# Patient Record
Sex: FEMALE | Race: OTHER | NOT HISPANIC OR LATINO
[De-identification: names, ages, dates, MRNs, and addresses within clinical notes are randomized per-mention and may not be internally consistent; named-entity substitution may affect disease eponyms.]

---

## 2017-01-06 ENCOUNTER — APPOINTMENT (OUTPATIENT)
Dept: OBGYN | Facility: CLINIC | Age: 27
End: 2017-01-06

## 2017-01-06 VITALS
SYSTOLIC BLOOD PRESSURE: 98 MMHG | DIASTOLIC BLOOD PRESSURE: 68 MMHG | BODY MASS INDEX: 24.59 KG/M2 | HEIGHT: 64 IN | WEIGHT: 144 LBS

## 2017-02-03 ENCOUNTER — APPOINTMENT (OUTPATIENT)
Dept: ANTEPARTUM | Facility: CLINIC | Age: 27
End: 2017-02-03

## 2017-02-03 ENCOUNTER — APPOINTMENT (OUTPATIENT)
Dept: OBGYN | Facility: CLINIC | Age: 27
End: 2017-02-03

## 2017-02-03 ENCOUNTER — ASOB RESULT (OUTPATIENT)
Age: 27
End: 2017-02-03

## 2017-02-03 VITALS
DIASTOLIC BLOOD PRESSURE: 64 MMHG | WEIGHT: 148 LBS | SYSTOLIC BLOOD PRESSURE: 100 MMHG | HEIGHT: 64 IN | BODY MASS INDEX: 25.27 KG/M2

## 2017-02-15 ENCOUNTER — APPOINTMENT (OUTPATIENT)
Dept: ANTEPARTUM | Facility: CLINIC | Age: 27
End: 2017-02-15

## 2017-02-15 ENCOUNTER — ASOB RESULT (OUTPATIENT)
Age: 27
End: 2017-02-15

## 2017-03-10 ENCOUNTER — APPOINTMENT (OUTPATIENT)
Dept: OBGYN | Facility: CLINIC | Age: 27
End: 2017-03-10

## 2017-03-10 VITALS
DIASTOLIC BLOOD PRESSURE: 65 MMHG | SYSTOLIC BLOOD PRESSURE: 107 MMHG | HEIGHT: 64 IN | BODY MASS INDEX: 25.68 KG/M2 | WEIGHT: 150.4 LBS

## 2017-03-31 ENCOUNTER — APPOINTMENT (OUTPATIENT)
Dept: OBGYN | Facility: CLINIC | Age: 27
End: 2017-03-31

## 2017-03-31 VITALS
WEIGHT: 153 LBS | HEIGHT: 64 IN | BODY MASS INDEX: 26.12 KG/M2 | DIASTOLIC BLOOD PRESSURE: 63 MMHG | SYSTOLIC BLOOD PRESSURE: 100 MMHG

## 2017-04-04 LAB
BASOPHILS # BLD AUTO: 0.01 K/UL
BASOPHILS NFR BLD AUTO: 0.1 %
EOSINOPHIL # BLD AUTO: 0.08 K/UL
EOSINOPHIL NFR BLD AUTO: 0.9 %
GLUCOSE 1H P 50 G GLC PO SERPL-MCNC: 89 MG/DL
HCT VFR BLD CALC: 36.9 %
HGB BLD-MCNC: 12.2 G/DL
IMM GRANULOCYTES NFR BLD AUTO: 0.4 %
LYMPHOCYTES # BLD AUTO: 1.72 K/UL
LYMPHOCYTES NFR BLD AUTO: 18.3 %
MAN DIFF?: NORMAL
MCHC RBC-ENTMCNC: 30.1 PG
MCHC RBC-ENTMCNC: 33.1 GM/DL
MCV RBC AUTO: 91.1 FL
MONOCYTES # BLD AUTO: 0.62 K/UL
MONOCYTES NFR BLD AUTO: 6.6 %
NEUTROPHILS # BLD AUTO: 6.92 K/UL
NEUTROPHILS NFR BLD AUTO: 73.7 %
PLATELET # BLD AUTO: 241 K/UL
RBC # BLD: 4.05 M/UL
RBC # FLD: 14.2 %
WBC # FLD AUTO: 9.39 K/UL

## 2017-04-17 ENCOUNTER — APPOINTMENT (OUTPATIENT)
Dept: OBGYN | Facility: CLINIC | Age: 27
End: 2017-04-17

## 2017-04-17 VITALS
HEIGHT: 64 IN | SYSTOLIC BLOOD PRESSURE: 102 MMHG | BODY MASS INDEX: 26.77 KG/M2 | WEIGHT: 156.8 LBS | DIASTOLIC BLOOD PRESSURE: 68 MMHG

## 2017-04-21 ENCOUNTER — OTHER (OUTPATIENT)
Age: 27
End: 2017-04-21

## 2017-05-01 ENCOUNTER — APPOINTMENT (OUTPATIENT)
Dept: ANTEPARTUM | Facility: CLINIC | Age: 27
End: 2017-05-01

## 2017-05-01 ENCOUNTER — ASOB RESULT (OUTPATIENT)
Age: 27
End: 2017-05-01

## 2017-05-02 ENCOUNTER — APPOINTMENT (OUTPATIENT)
Dept: OBGYN | Facility: CLINIC | Age: 27
End: 2017-05-02

## 2017-05-02 VITALS
SYSTOLIC BLOOD PRESSURE: 96 MMHG | HEIGHT: 64 IN | DIASTOLIC BLOOD PRESSURE: 60 MMHG | WEIGHT: 152 LBS | BODY MASS INDEX: 25.95 KG/M2

## 2017-05-15 ENCOUNTER — APPOINTMENT (OUTPATIENT)
Dept: OBGYN | Facility: CLINIC | Age: 27
End: 2017-05-15

## 2017-05-15 VITALS
SYSTOLIC BLOOD PRESSURE: 95 MMHG | HEIGHT: 64 IN | BODY MASS INDEX: 27.31 KG/M2 | DIASTOLIC BLOOD PRESSURE: 63 MMHG | WEIGHT: 160 LBS

## 2017-05-16 ENCOUNTER — APPOINTMENT (OUTPATIENT)
Dept: ANTEPARTUM | Facility: CLINIC | Age: 27
End: 2017-05-16

## 2017-05-16 ENCOUNTER — OUTPATIENT (OUTPATIENT)
Dept: OUTPATIENT SERVICES | Facility: HOSPITAL | Age: 27
LOS: 1 days | End: 2017-05-16

## 2017-05-16 ENCOUNTER — ASOB RESULT (OUTPATIENT)
Age: 27
End: 2017-05-16

## 2017-05-17 DIAGNOSIS — O36.5930 MATERNAL CARE FOR OTHER KNOWN OR SUSPECTED POOR FETAL GROWTH, THIRD TRIMESTER, NOT APPLICABLE OR UNSPECIFIED: ICD-10-CM

## 2017-05-17 DIAGNOSIS — Z3A.35 35 WEEKS GESTATION OF PREGNANCY: ICD-10-CM

## 2017-05-23 ENCOUNTER — APPOINTMENT (OUTPATIENT)
Dept: ANTEPARTUM | Facility: CLINIC | Age: 27
End: 2017-05-23

## 2017-05-23 ENCOUNTER — ASOB RESULT (OUTPATIENT)
Age: 27
End: 2017-05-23

## 2017-05-23 ENCOUNTER — OUTPATIENT (OUTPATIENT)
Dept: OUTPATIENT SERVICES | Facility: HOSPITAL | Age: 27
LOS: 1 days | End: 2017-05-23

## 2017-05-23 ENCOUNTER — APPOINTMENT (OUTPATIENT)
Dept: OBGYN | Facility: CLINIC | Age: 27
End: 2017-05-23

## 2017-05-23 ENCOUNTER — APPOINTMENT (OUTPATIENT)
Dept: ANTEPARTUM | Facility: HOSPITAL | Age: 27
End: 2017-05-23

## 2017-05-23 VITALS
DIASTOLIC BLOOD PRESSURE: 66 MMHG | WEIGHT: 161 LBS | HEIGHT: 64 IN | SYSTOLIC BLOOD PRESSURE: 116 MMHG | BODY MASS INDEX: 27.49 KG/M2

## 2017-05-25 DIAGNOSIS — O36.5930 MATERNAL CARE FOR OTHER KNOWN OR SUSPECTED POOR FETAL GROWTH, THIRD TRIMESTER, NOT APPLICABLE OR UNSPECIFIED: ICD-10-CM

## 2017-05-25 DIAGNOSIS — Z3A.36 36 WEEKS GESTATION OF PREGNANCY: ICD-10-CM

## 2017-05-26 LAB
GP B STREP DNA SPEC QL NAA+PROBE: NORMAL
GP B STREP DNA SPEC QL NAA+PROBE: NOT DETECTED
SOURCE GBS: NORMAL

## 2017-05-30 ENCOUNTER — APPOINTMENT (OUTPATIENT)
Dept: OBGYN | Facility: CLINIC | Age: 27
End: 2017-05-30

## 2017-05-30 ENCOUNTER — APPOINTMENT (OUTPATIENT)
Dept: ANTEPARTUM | Facility: HOSPITAL | Age: 27
End: 2017-05-30

## 2017-05-30 ENCOUNTER — OUTPATIENT (OUTPATIENT)
Dept: OUTPATIENT SERVICES | Facility: HOSPITAL | Age: 27
LOS: 1 days | End: 2017-05-30

## 2017-05-30 ENCOUNTER — APPOINTMENT (OUTPATIENT)
Dept: ANTEPARTUM | Facility: CLINIC | Age: 27
End: 2017-05-30

## 2017-05-30 ENCOUNTER — ASOB RESULT (OUTPATIENT)
Age: 27
End: 2017-05-30

## 2017-05-30 VITALS
HEIGHT: 64 IN | SYSTOLIC BLOOD PRESSURE: 101 MMHG | DIASTOLIC BLOOD PRESSURE: 69 MMHG | WEIGHT: 160.5 LBS | BODY MASS INDEX: 27.4 KG/M2

## 2017-05-31 DIAGNOSIS — Z3A.37 37 WEEKS GESTATION OF PREGNANCY: ICD-10-CM

## 2017-05-31 DIAGNOSIS — O36.5930 MATERNAL CARE FOR OTHER KNOWN OR SUSPECTED POOR FETAL GROWTH, THIRD TRIMESTER, NOT APPLICABLE OR UNSPECIFIED: ICD-10-CM

## 2017-06-06 ENCOUNTER — APPOINTMENT (OUTPATIENT)
Dept: ANTEPARTUM | Facility: HOSPITAL | Age: 27
End: 2017-06-06

## 2017-06-06 ENCOUNTER — APPOINTMENT (OUTPATIENT)
Dept: ANTEPARTUM | Facility: CLINIC | Age: 27
End: 2017-06-06

## 2017-06-07 ENCOUNTER — APPOINTMENT (OUTPATIENT)
Dept: OBGYN | Facility: CLINIC | Age: 27
End: 2017-06-07

## 2017-06-07 VITALS
HEIGHT: 64 IN | WEIGHT: 160.31 LBS | SYSTOLIC BLOOD PRESSURE: 113 MMHG | BODY MASS INDEX: 27.37 KG/M2 | DIASTOLIC BLOOD PRESSURE: 75 MMHG

## 2017-06-07 DIAGNOSIS — Z00.00 ENCOUNTER FOR GENERAL ADULT MEDICAL EXAMINATION W/OUT ABNORMAL FINDINGS: ICD-10-CM

## 2017-06-07 DIAGNOSIS — Z34.02 ENCOUNTER FOR SUPERVISION OF NORMAL FIRST PREGNANCY, SECOND TRIMESTER: ICD-10-CM

## 2017-06-11 ENCOUNTER — OUTPATIENT (OUTPATIENT)
Dept: OUTPATIENT SERVICES | Facility: HOSPITAL | Age: 27
LOS: 1 days | End: 2017-06-11

## 2017-06-11 DIAGNOSIS — O26.899 OTHER SPECIFIED PREGNANCY RELATED CONDITIONS, UNSPECIFIED TRIMESTER: ICD-10-CM

## 2017-06-12 ENCOUNTER — INPATIENT (INPATIENT)
Facility: HOSPITAL | Age: 27
LOS: 1 days | Discharge: HOME | End: 2017-06-14
Attending: OBSTETRICS & GYNECOLOGY | Admitting: OBSTETRICS & GYNECOLOGY
Payer: COMMERCIAL

## 2017-06-12 VITALS — WEIGHT: 160.94 LBS | HEIGHT: 64 IN

## 2017-06-12 DIAGNOSIS — Z3A.00 WEEKS OF GESTATION OF PREGNANCY NOT SPECIFIED: ICD-10-CM

## 2017-06-12 DIAGNOSIS — O26.90 PREGNANCY RELATED CONDITIONS, UNSPECIFIED, UNSPECIFIED TRIMESTER: ICD-10-CM

## 2017-06-12 LAB
BASOPHILS # BLD AUTO: 0.02 K/UL — SIGNIFICANT CHANGE UP (ref 0–0.2)
BASOPHILS NFR BLD AUTO: 0.1 % — SIGNIFICANT CHANGE UP (ref 0–2)
BLD GP AB SCN SERPL QL: NEGATIVE — SIGNIFICANT CHANGE UP
EOSINOPHIL # BLD AUTO: 0.02 K/UL — SIGNIFICANT CHANGE UP (ref 0–0.5)
EOSINOPHIL NFR BLD AUTO: 0.1 % — SIGNIFICANT CHANGE UP (ref 0–6)
HCT VFR BLD CALC: 41.6 % — SIGNIFICANT CHANGE UP (ref 34.5–45)
HGB BLD-MCNC: 13.8 G/DL — SIGNIFICANT CHANGE UP (ref 11.5–15.5)
IMM GRANULOCYTES NFR BLD AUTO: 0.4 % — SIGNIFICANT CHANGE UP (ref 0–1.5)
LYMPHOCYTES # BLD AUTO: 15.4 % — SIGNIFICANT CHANGE UP (ref 13–44)
LYMPHOCYTES # BLD AUTO: 2.26 K/UL — SIGNIFICANT CHANGE UP (ref 1–3.3)
MCHC RBC-ENTMCNC: 29.6 PG — SIGNIFICANT CHANGE UP (ref 27–34)
MCHC RBC-ENTMCNC: 33.2 % — SIGNIFICANT CHANGE UP (ref 32–36)
MCV RBC AUTO: 89.3 FL — SIGNIFICANT CHANGE UP (ref 80–100)
MONOCYTES # BLD AUTO: 0.89 K/UL — SIGNIFICANT CHANGE UP (ref 0–0.9)
MONOCYTES NFR BLD AUTO: 6.1 % — SIGNIFICANT CHANGE UP (ref 2–14)
NEUTROPHILS # BLD AUTO: 11.45 K/UL — HIGH (ref 1.8–7.4)
NEUTROPHILS NFR BLD AUTO: 77.9 % — HIGH (ref 43–77)
PLATELET # BLD AUTO: 234 K/UL — SIGNIFICANT CHANGE UP (ref 150–400)
PMV BLD: 9.5 FL — SIGNIFICANT CHANGE UP (ref 7–13)
RBC # BLD: 4.66 M/UL — SIGNIFICANT CHANGE UP (ref 3.8–5.2)
RBC # FLD: 14.1 % — SIGNIFICANT CHANGE UP (ref 10.3–14.5)
RH IG SCN BLD-IMP: POSITIVE — SIGNIFICANT CHANGE UP
RH IG SCN BLD-IMP: POSITIVE — SIGNIFICANT CHANGE UP
T PALLIDUM AB TITR SER: NEGATIVE — SIGNIFICANT CHANGE UP
WBC # BLD: 14.7 K/UL — HIGH (ref 3.8–10.5)
WBC # FLD AUTO: 14.7 K/UL — HIGH (ref 3.8–10.5)

## 2017-06-12 PROCEDURE — 59400 OBSTETRICAL CARE: CPT | Mod: GC

## 2017-06-12 RX ORDER — ACETAMINOPHEN 500 MG
975 TABLET ORAL EVERY 6 HOURS
Qty: 0 | Refills: 0 | Status: COMPLETED | OUTPATIENT
Start: 2017-06-12 | End: 2018-05-11

## 2017-06-12 RX ORDER — SIMETHICONE 80 MG/1
80 TABLET, CHEWABLE ORAL EVERY 6 HOURS
Qty: 0 | Refills: 0 | Status: DISCONTINUED | OUTPATIENT
Start: 2017-06-12 | End: 2017-06-14

## 2017-06-12 RX ORDER — GLYCERIN ADULT
1 SUPPOSITORY, RECTAL RECTAL AT BEDTIME
Qty: 0 | Refills: 0 | Status: DISCONTINUED | OUTPATIENT
Start: 2017-06-12 | End: 2017-06-14

## 2017-06-12 RX ORDER — KETOROLAC TROMETHAMINE 30 MG/ML
30 SYRINGE (ML) INJECTION ONCE
Qty: 0 | Refills: 0 | Status: DISCONTINUED | OUTPATIENT
Start: 2017-06-12 | End: 2017-06-12

## 2017-06-12 RX ORDER — SODIUM CHLORIDE 9 MG/ML
1000 INJECTION, SOLUTION INTRAVENOUS ONCE
Qty: 0 | Refills: 0 | Status: COMPLETED | OUTPATIENT
Start: 2017-06-12 | End: 2017-06-12

## 2017-06-12 RX ORDER — OXYTOCIN 10 UNIT/ML
41.67 VIAL (ML) INJECTION
Qty: 20 | Refills: 0 | Status: DISCONTINUED | OUTPATIENT
Start: 2017-06-12 | End: 2017-06-12

## 2017-06-12 RX ORDER — HYDROCORTISONE 1 %
1 OINTMENT (GRAM) TOPICAL EVERY 4 HOURS
Qty: 0 | Refills: 0 | Status: DISCONTINUED | OUTPATIENT
Start: 2017-06-12 | End: 2017-06-12

## 2017-06-12 RX ORDER — DIPHENHYDRAMINE HCL 50 MG
25 CAPSULE ORAL EVERY 6 HOURS
Qty: 0 | Refills: 0 | Status: DISCONTINUED | OUTPATIENT
Start: 2017-06-12 | End: 2017-06-14

## 2017-06-12 RX ORDER — OXYCODONE HYDROCHLORIDE 5 MG/1
5 TABLET ORAL
Qty: 0 | Refills: 0 | Status: DISCONTINUED | OUTPATIENT
Start: 2017-06-12 | End: 2017-06-14

## 2017-06-12 RX ORDER — MAGNESIUM HYDROXIDE 400 MG/1
30 TABLET, CHEWABLE ORAL
Qty: 0 | Refills: 0 | Status: DISCONTINUED | OUTPATIENT
Start: 2017-06-12 | End: 2017-06-14

## 2017-06-12 RX ORDER — OXYTOCIN 10 UNIT/ML
2 VIAL (ML) INJECTION
Qty: 30 | Refills: 0 | Status: DISCONTINUED | OUTPATIENT
Start: 2017-06-12 | End: 2017-06-12

## 2017-06-12 RX ORDER — AER TRAVELER 0.5 G/1
1 SOLUTION RECTAL; TOPICAL EVERY 4 HOURS
Qty: 0 | Refills: 0 | Status: DISCONTINUED | OUTPATIENT
Start: 2017-06-12 | End: 2017-06-14

## 2017-06-12 RX ORDER — OXYCODONE HYDROCHLORIDE 5 MG/1
5 TABLET ORAL EVERY 4 HOURS
Qty: 0 | Refills: 0 | Status: DISCONTINUED | OUTPATIENT
Start: 2017-06-12 | End: 2017-06-14

## 2017-06-12 RX ORDER — LANOLIN
1 OINTMENT (GRAM) TOPICAL EVERY 6 HOURS
Qty: 0 | Refills: 0 | Status: DISCONTINUED | OUTPATIENT
Start: 2017-06-12 | End: 2017-06-14

## 2017-06-12 RX ORDER — TETANUS TOXOID, REDUCED DIPHTHERIA TOXOID AND ACELLULAR PERTUSSIS VACCINE, ADSORBED 5; 2.5; 8; 8; 2.5 [IU]/.5ML; [IU]/.5ML; UG/.5ML; UG/.5ML; UG/.5ML
0.5 SUSPENSION INTRAMUSCULAR ONCE
Qty: 0 | Refills: 0 | Status: COMPLETED | OUTPATIENT
Start: 2017-06-12

## 2017-06-12 RX ORDER — OXYTOCIN 10 UNIT/ML
333.3 VIAL (ML) INJECTION
Qty: 20 | Refills: 0 | Status: COMPLETED | OUTPATIENT
Start: 2017-06-12

## 2017-06-12 RX ORDER — HYDROCORTISONE 1 %
1 OINTMENT (GRAM) TOPICAL EVERY 4 HOURS
Qty: 0 | Refills: 0 | Status: DISCONTINUED | OUTPATIENT
Start: 2017-06-12 | End: 2017-06-14

## 2017-06-12 RX ORDER — OXYTOCIN 10 UNIT/ML
333.3 VIAL (ML) INJECTION
Qty: 20 | Refills: 0 | Status: COMPLETED | OUTPATIENT
Start: 2017-06-12 | End: 2017-06-12

## 2017-06-12 RX ORDER — DOCUSATE SODIUM 100 MG
100 CAPSULE ORAL
Qty: 0 | Refills: 0 | Status: DISCONTINUED | OUTPATIENT
Start: 2017-06-12 | End: 2017-06-14

## 2017-06-12 RX ORDER — PRAMOXINE HYDROCHLORIDE 150 MG/15G
1 AEROSOL, FOAM RECTAL EVERY 4 HOURS
Qty: 0 | Refills: 0 | Status: DISCONTINUED | OUTPATIENT
Start: 2017-06-12 | End: 2017-06-12

## 2017-06-12 RX ORDER — ACETAMINOPHEN 500 MG
975 TABLET ORAL EVERY 6 HOURS
Qty: 0 | Refills: 0 | Status: DISCONTINUED | OUTPATIENT
Start: 2017-06-12 | End: 2017-06-14

## 2017-06-12 RX ORDER — CITRIC ACID/SODIUM CITRATE 300-500 MG
15 SOLUTION, ORAL ORAL EVERY 4 HOURS
Qty: 0 | Refills: 0 | Status: DISCONTINUED | OUTPATIENT
Start: 2017-06-12 | End: 2017-06-12

## 2017-06-12 RX ORDER — IBUPROFEN 200 MG
600 TABLET ORAL EVERY 6 HOURS
Qty: 0 | Refills: 0 | Status: DISCONTINUED | OUTPATIENT
Start: 2017-06-12 | End: 2017-06-14

## 2017-06-12 RX ORDER — IBUPROFEN 200 MG
1 TABLET ORAL
Qty: 0 | Refills: 0 | COMMUNITY
Start: 2017-06-12

## 2017-06-12 RX ORDER — DIBUCAINE 1 %
1 OINTMENT (GRAM) RECTAL EVERY 4 HOURS
Qty: 0 | Refills: 0 | Status: DISCONTINUED | OUTPATIENT
Start: 2017-06-12 | End: 2017-06-14

## 2017-06-12 RX ORDER — DIBUCAINE 1 %
1 OINTMENT (GRAM) RECTAL EVERY 4 HOURS
Qty: 0 | Refills: 0 | Status: DISCONTINUED | OUTPATIENT
Start: 2017-06-12 | End: 2017-06-12

## 2017-06-12 RX ORDER — IBUPROFEN 200 MG
600 TABLET ORAL EVERY 6 HOURS
Qty: 0 | Refills: 0 | Status: COMPLETED | OUTPATIENT
Start: 2017-06-12 | End: 2018-05-11

## 2017-06-12 RX ORDER — SODIUM CHLORIDE 9 MG/ML
3 INJECTION INTRAMUSCULAR; INTRAVENOUS; SUBCUTANEOUS EVERY 8 HOURS
Qty: 0 | Refills: 0 | Status: DISCONTINUED | OUTPATIENT
Start: 2017-06-12 | End: 2017-06-12

## 2017-06-12 RX ORDER — SODIUM CHLORIDE 9 MG/ML
3 INJECTION INTRAMUSCULAR; INTRAVENOUS; SUBCUTANEOUS EVERY 8 HOURS
Qty: 0 | Refills: 0 | Status: DISCONTINUED | OUTPATIENT
Start: 2017-06-12 | End: 2017-06-14

## 2017-06-12 RX ORDER — AER TRAVELER 0.5 G/1
1 SOLUTION RECTAL; TOPICAL EVERY 4 HOURS
Qty: 0 | Refills: 0 | Status: DISCONTINUED | OUTPATIENT
Start: 2017-06-12 | End: 2017-06-12

## 2017-06-12 RX ORDER — PRAMOXINE HYDROCHLORIDE 150 MG/15G
1 AEROSOL, FOAM RECTAL EVERY 4 HOURS
Qty: 0 | Refills: 0 | Status: DISCONTINUED | OUTPATIENT
Start: 2017-06-12 | End: 2017-06-14

## 2017-06-12 RX ORDER — SODIUM CHLORIDE 9 MG/ML
1000 INJECTION, SOLUTION INTRAVENOUS
Qty: 0 | Refills: 0 | Status: DISCONTINUED | OUTPATIENT
Start: 2017-06-12 | End: 2017-06-12

## 2017-06-12 RX ADMIN — SODIUM CHLORIDE 125 MILLILITER(S): 9 INJECTION, SOLUTION INTRAVENOUS at 07:22

## 2017-06-12 RX ADMIN — SODIUM CHLORIDE 3 MILLILITER(S): 9 INJECTION INTRAMUSCULAR; INTRAVENOUS; SUBCUTANEOUS at 22:00

## 2017-06-12 RX ADMIN — Medication 999.9 MILLIUNIT(S)/MIN: at 13:07

## 2017-06-12 RX ADMIN — SODIUM CHLORIDE 2000 MILLILITER(S): 9 INJECTION, SOLUTION INTRAVENOUS at 02:15

## 2017-06-12 RX ADMIN — Medication 125 MILLIUNIT(S)/MIN: at 13:09

## 2017-06-12 RX ADMIN — Medication 2 MILLIUNIT(S)/MIN: at 08:42

## 2017-06-12 RX ADMIN — Medication 600 MILLIGRAM(S): at 22:50

## 2017-06-12 RX ADMIN — Medication 30 MILLIGRAM(S): at 13:09

## 2017-06-12 RX ADMIN — Medication 600 MILLIGRAM(S): at 23:20

## 2017-06-12 RX ADMIN — Medication 975 MILLIGRAM(S): at 23:20

## 2017-06-12 RX ADMIN — Medication 975 MILLIGRAM(S): at 22:50

## 2017-06-12 NOTE — DISCHARGE NOTE OB - HOSPITAL COURSE
27 P0 @ 39 weeks admitted with early labor. Delivered viable male infant apgars 9.9. Postpartum course uncomplicated.

## 2017-06-12 NOTE — DISCHARGE NOTE OB - MEDICATION SUMMARY - MEDICATIONS TO TAKE
I will START or STAY ON the medications listed below when I get home from the hospital:    ibuprofen 600 mg oral tablet  -- 1 tab(s) by mouth every 6 hours  -- Indication: For Pain I will START or STAY ON the medications listed below when I get home from the hospital:    ibuprofen 600 mg oral tablet  -- 1 tab(s) by mouth every 6 hours, As Needed  -- Indication: For Pain

## 2017-06-12 NOTE — DISCHARGE NOTE OB - CARE PROVIDER_API CALL
Morales Mercedes (MD), Obstetrics and Gynecology  [address] [place] [address]  Phone: [phone]  Fax: [phone]

## 2017-06-12 NOTE — DISCHARGE NOTE OB - MEDICATION SUMMARY - MEDICATIONS TO STOP TAKING
I will STOP taking the medications listed below when I get home from the hospital:    Percocet 5/325 oral tablet  -- 1 tab(s) by mouth every 8 hours, As Needed MDD:3 - for severe pain  -- Caution federal law prohibits the transfer of this drug to any person other  than the person for whom it was prescribed.  May cause drowsiness.  Alcohol may intensify this effect.  Use care when operating dangerous machinery.  This prescription cannot be refilled.  This product contains acetaminophen.  Do not use  with any other product containing acetaminophen to prevent possible liver damage.  Using more of this medication than prescribed may cause serious breathing problems.

## 2017-06-13 ENCOUNTER — APPOINTMENT (OUTPATIENT)
Dept: ANTEPARTUM | Facility: CLINIC | Age: 27
End: 2017-06-13

## 2017-06-13 RX ADMIN — SODIUM CHLORIDE 3 MILLILITER(S): 9 INJECTION INTRAMUSCULAR; INTRAVENOUS; SUBCUTANEOUS at 06:56

## 2017-06-13 RX ADMIN — Medication 1 TABLET(S): at 12:26

## 2017-06-13 RX ADMIN — Medication 100 MILLIGRAM(S): at 06:46

## 2017-06-13 NOTE — LACTATION INITIAL EVALUATION - INTERVENTION OUTCOME
Worked with mother to try to latch  and  not maintaining latch and over 24 hours and mother fitted for nipple shield for flat nipples and educated mother to look for colostrum in shield and none noted at this time and mother to triple feed, Discussed feeding cues and to breastfeed 8-12 times in 24 hours and discussed breast pumping every 2-3 hours and give expressed colostrum and mother getting 3cc and discussed to give formula , and educated mother on importance of wet and dirty diapers , RN aware of plan/demonstrates understanding of teaching/good return demonstration/verbalizes understanding

## 2017-06-13 NOTE — LACTATION INITIAL EVALUATION - LACTATION INTERVENTIONS
initiate skin to skin/initiate hand expression routine/initiate dual electric pump routine/stimulate nutritive suck using

## 2017-06-13 NOTE — PROGRESS NOTE ADULT - SUBJECTIVE AND OBJECTIVE BOX
S: Patient doing well. Minimal lochia. Pain controlled.    O: Vital Signs Last 24 Hrs  T(C): 36.9, Max: 37.2 (06-12 @ 21:30)  T(F): 98.4, Max: 99 (06-12 @ 21:30)  HR: 82 (82 - 90)  BP: 113/60 (95/52 - 113/60)  BP(mean): --  RR: 18 (16 - 18)  SpO2: 99% (99% - 99%)    Gen: NAD  Abd: soft, NT, ND, fundus firm below umbilicus  Lochia: moderate  Ext: no tenderness    Labs:                        13.8   14.70 )-----------( 234      ( 2017 02:10 )             41.6       A: 27y PPD# 1 s/p  doing well.    Plan:for discharge in AM

## 2017-06-14 VITALS
TEMPERATURE: 98 F | RESPIRATION RATE: 18 BRPM | SYSTOLIC BLOOD PRESSURE: 99 MMHG | DIASTOLIC BLOOD PRESSURE: 61 MMHG | HEART RATE: 67 BPM | OXYGEN SATURATION: 100 %

## 2017-06-14 RX ORDER — TETANUS TOXOID, REDUCED DIPHTHERIA TOXOID AND ACELLULAR PERTUSSIS VACCINE, ADSORBED 5; 2.5; 8; 8; 2.5 [IU]/.5ML; [IU]/.5ML; UG/.5ML; UG/.5ML; UG/.5ML
0.5 SUSPENSION INTRAMUSCULAR ONCE
Qty: 0 | Refills: 0 | Status: COMPLETED | OUTPATIENT
Start: 2017-06-14 | End: 2017-06-14

## 2017-06-14 RX ADMIN — TETANUS TOXOID, REDUCED DIPHTHERIA TOXOID AND ACELLULAR PERTUSSIS VACCINE, ADSORBED 0.5 MILLILITER(S): 5; 2.5; 8; 8; 2.5 SUSPENSION INTRAMUSCULAR at 06:45

## 2017-06-14 RX ADMIN — Medication 0.5 MILLILITER(S): at 11:33

## 2017-06-16 ENCOUNTER — APPOINTMENT (OUTPATIENT)
Dept: OBGYN | Facility: CLINIC | Age: 27
End: 2017-06-16

## 2017-07-28 ENCOUNTER — APPOINTMENT (OUTPATIENT)
Dept: OBGYN | Facility: CLINIC | Age: 27
End: 2017-07-28
Payer: COMMERCIAL

## 2017-07-28 VITALS
SYSTOLIC BLOOD PRESSURE: 105 MMHG | DIASTOLIC BLOOD PRESSURE: 69 MMHG | HEIGHT: 64 IN | BODY MASS INDEX: 25.36 KG/M2 | HEART RATE: 64 BPM | WEIGHT: 148.56 LBS

## 2017-07-28 PROCEDURE — 0503F POSTPARTUM CARE VISIT: CPT

## 2017-07-31 LAB
CANDIDA VAG CYTO: NOT DETECTED
G VAGINALIS+PREV SP MTYP VAG QL MICRO: DETECTED
T VAGINALIS VAG QL WET PREP: NOT DETECTED

## 2017-12-05 ENCOUNTER — APPOINTMENT (OUTPATIENT)
Dept: OBGYN | Facility: CLINIC | Age: 27
End: 2017-12-05

## 2018-06-19 ENCOUNTER — EMERGENCY (EMERGENCY)
Facility: HOSPITAL | Age: 28
LOS: 1 days | Discharge: LEFT BEFORE DISCHARGE | End: 2018-06-19
Attending: STUDENT IN AN ORGANIZED HEALTH CARE EDUCATION/TRAINING PROGRAM | Admitting: STUDENT IN AN ORGANIZED HEALTH CARE EDUCATION/TRAINING PROGRAM
Payer: COMMERCIAL

## 2018-06-19 VITALS
TEMPERATURE: 98 F | HEART RATE: 60 BPM | SYSTOLIC BLOOD PRESSURE: 95 MMHG | DIASTOLIC BLOOD PRESSURE: 64 MMHG | OXYGEN SATURATION: 100 % | RESPIRATION RATE: 16 BRPM

## 2018-06-19 VITALS
TEMPERATURE: 98 F | DIASTOLIC BLOOD PRESSURE: 63 MMHG | RESPIRATION RATE: 16 BRPM | HEART RATE: 72 BPM | OXYGEN SATURATION: 98 % | SYSTOLIC BLOOD PRESSURE: 100 MMHG

## 2018-06-19 LAB
ALBUMIN SERPL ELPH-MCNC: 3.4 G/DL — SIGNIFICANT CHANGE UP (ref 3.3–5)
ALP SERPL-CCNC: 56 U/L — SIGNIFICANT CHANGE UP (ref 40–120)
ALT FLD-CCNC: 20 U/L — SIGNIFICANT CHANGE UP (ref 4–33)
AST SERPL-CCNC: 16 U/L — SIGNIFICANT CHANGE UP (ref 4–32)
BASE EXCESS BLDV CALC-SCNC: -1 MMOL/L — SIGNIFICANT CHANGE UP
BASOPHILS # BLD AUTO: 0.02 K/UL — SIGNIFICANT CHANGE UP (ref 0–0.2)
BASOPHILS NFR BLD AUTO: 0.2 % — SIGNIFICANT CHANGE UP (ref 0–2)
BILIRUB SERPL-MCNC: < 0.2 MG/DL — LOW (ref 0.2–1.2)
BLOOD GAS VENOUS - CREATININE: 0.61 MG/DL — SIGNIFICANT CHANGE UP (ref 0.5–1.3)
BUN SERPL-MCNC: 13 MG/DL — SIGNIFICANT CHANGE UP (ref 7–23)
CALCIUM SERPL-MCNC: 8.2 MG/DL — LOW (ref 8.4–10.5)
CHLORIDE BLDV-SCNC: 113 MMOL/L — HIGH (ref 96–108)
CHLORIDE SERPL-SCNC: 106 MMOL/L — SIGNIFICANT CHANGE UP (ref 98–107)
CO2 SERPL-SCNC: 22 MMOL/L — SIGNIFICANT CHANGE UP (ref 22–31)
CREAT SERPL-MCNC: 0.66 MG/DL — SIGNIFICANT CHANGE UP (ref 0.5–1.3)
EOSINOPHIL # BLD AUTO: 0.13 K/UL — SIGNIFICANT CHANGE UP (ref 0–0.5)
EOSINOPHIL NFR BLD AUTO: 1.5 % — SIGNIFICANT CHANGE UP (ref 0–6)
GAS PNL BLDV: 135 MMOL/L — LOW (ref 136–146)
GLUCOSE BLDV-MCNC: 92 — SIGNIFICANT CHANGE UP (ref 70–99)
GLUCOSE SERPL-MCNC: 92 MG/DL — SIGNIFICANT CHANGE UP (ref 70–99)
HCO3 BLDV-SCNC: 22 MMOL/L — SIGNIFICANT CHANGE UP (ref 20–27)
HCT VFR BLD CALC: 36.5 % — SIGNIFICANT CHANGE UP (ref 34.5–45)
HCT VFR BLDV CALC: 39.4 % — SIGNIFICANT CHANGE UP (ref 34.5–45)
HGB BLD-MCNC: 12 G/DL — SIGNIFICANT CHANGE UP (ref 11.5–15.5)
HGB BLDV-MCNC: 12.8 G/DL — SIGNIFICANT CHANGE UP (ref 11.5–15.5)
IMM GRANULOCYTES # BLD AUTO: 0.02 # — SIGNIFICANT CHANGE UP
IMM GRANULOCYTES NFR BLD AUTO: 0.2 % — SIGNIFICANT CHANGE UP (ref 0–1.5)
LACTATE BLDV-MCNC: 0.9 MMOL/L — SIGNIFICANT CHANGE UP (ref 0.5–2)
LIDOCAIN IGE QN: 25.2 U/L — SIGNIFICANT CHANGE UP (ref 7–60)
LYMPHOCYTES # BLD AUTO: 1.73 K/UL — SIGNIFICANT CHANGE UP (ref 1–3.3)
LYMPHOCYTES # BLD AUTO: 20.2 % — SIGNIFICANT CHANGE UP (ref 13–44)
MCHC RBC-ENTMCNC: 28.1 PG — SIGNIFICANT CHANGE UP (ref 27–34)
MCHC RBC-ENTMCNC: 32.9 % — SIGNIFICANT CHANGE UP (ref 32–36)
MCV RBC AUTO: 85.5 FL — SIGNIFICANT CHANGE UP (ref 80–100)
MONOCYTES # BLD AUTO: 0.51 K/UL — SIGNIFICANT CHANGE UP (ref 0–0.9)
MONOCYTES NFR BLD AUTO: 6 % — SIGNIFICANT CHANGE UP (ref 2–14)
NEUTROPHILS # BLD AUTO: 6.14 K/UL — SIGNIFICANT CHANGE UP (ref 1.8–7.4)
NEUTROPHILS NFR BLD AUTO: 71.9 % — SIGNIFICANT CHANGE UP (ref 43–77)
NRBC # FLD: 0 — SIGNIFICANT CHANGE UP
PCO2 BLDV: 46 MMHG — SIGNIFICANT CHANGE UP (ref 41–51)
PH BLDV: 7.34 PH — SIGNIFICANT CHANGE UP (ref 7.32–7.43)
PLATELET # BLD AUTO: 221 K/UL — SIGNIFICANT CHANGE UP (ref 150–400)
PMV BLD: 9.5 FL — SIGNIFICANT CHANGE UP (ref 7–13)
PO2 BLDV: < 24 MMHG — LOW (ref 35–40)
POTASSIUM BLDV-SCNC: 3.6 MMOL/L — SIGNIFICANT CHANGE UP (ref 3.4–4.5)
POTASSIUM SERPL-MCNC: 4.1 MMOL/L — SIGNIFICANT CHANGE UP (ref 3.5–5.3)
POTASSIUM SERPL-SCNC: 4.1 MMOL/L — SIGNIFICANT CHANGE UP (ref 3.5–5.3)
PROT SERPL-MCNC: 6 G/DL — SIGNIFICANT CHANGE UP (ref 6–8.3)
RBC # BLD: 4.27 M/UL — SIGNIFICANT CHANGE UP (ref 3.8–5.2)
RBC # FLD: 12.9 % — SIGNIFICANT CHANGE UP (ref 10.3–14.5)
SAO2 % BLDV: 16 % — LOW (ref 60–85)
SODIUM SERPL-SCNC: 140 MMOL/L — SIGNIFICANT CHANGE UP (ref 135–145)
TROPONIN T, HIGH SENSITIVITY RESULT: < 6 NG/L — SIGNIFICANT CHANGE UP (ref ?–14)
WBC # BLD: 8.55 K/UL — SIGNIFICANT CHANGE UP (ref 3.8–10.5)
WBC # FLD AUTO: 8.55 K/UL — SIGNIFICANT CHANGE UP (ref 3.8–10.5)

## 2018-06-19 PROCEDURE — 99285 EMERGENCY DEPT VISIT HI MDM: CPT | Mod: 25

## 2018-06-19 PROCEDURE — 12011 RPR F/E/E/N/L/M 2.5 CM/<: CPT

## 2018-06-19 PROCEDURE — 72125 CT NECK SPINE W/O DYE: CPT | Mod: 26

## 2018-06-19 PROCEDURE — 70486 CT MAXILLOFACIAL W/O DYE: CPT | Mod: 26

## 2018-06-19 PROCEDURE — 70450 CT HEAD/BRAIN W/O DYE: CPT | Mod: 26

## 2018-06-19 PROCEDURE — 93010 ELECTROCARDIOGRAM REPORT: CPT | Mod: XU

## 2018-06-19 RX ORDER — SODIUM CHLORIDE 9 MG/ML
1000 INJECTION INTRAMUSCULAR; INTRAVENOUS; SUBCUTANEOUS ONCE
Qty: 0 | Refills: 0 | Status: COMPLETED | OUTPATIENT
Start: 2018-06-19 | End: 2018-06-19

## 2018-06-19 RX ORDER — TETANUS TOXOID, REDUCED DIPHTHERIA TOXOID AND ACELLULAR PERTUSSIS VACCINE, ADSORBED 5; 2.5; 8; 8; 2.5 [IU]/.5ML; [IU]/.5ML; UG/.5ML; UG/.5ML; UG/.5ML
0.5 SUSPENSION INTRAMUSCULAR ONCE
Qty: 0 | Refills: 0 | Status: DISCONTINUED | OUTPATIENT
Start: 2018-06-19 | End: 2018-06-23

## 2018-06-19 RX ORDER — ACETAMINOPHEN 500 MG
1000 TABLET ORAL ONCE
Qty: 0 | Refills: 0 | Status: COMPLETED | OUTPATIENT
Start: 2018-06-19 | End: 2018-06-19

## 2018-06-19 RX ORDER — ACETAMINOPHEN 500 MG
650 TABLET ORAL ONCE
Qty: 0 | Refills: 0 | Status: DISCONTINUED | OUTPATIENT
Start: 2018-06-19 | End: 2018-06-19

## 2018-06-19 RX ORDER — SODIUM CHLORIDE 9 MG/ML
1000 INJECTION INTRAMUSCULAR; INTRAVENOUS; SUBCUTANEOUS ONCE
Qty: 0 | Refills: 0 | Status: DISCONTINUED | OUTPATIENT
Start: 2018-06-19 | End: 2018-06-23

## 2018-06-19 RX ADMIN — Medication 400 MILLIGRAM(S): at 10:26

## 2018-06-19 RX ADMIN — SODIUM CHLORIDE 1000 MILLILITER(S): 9 INJECTION INTRAMUSCULAR; INTRAVENOUS; SUBCUTANEOUS at 09:15

## 2018-06-19 NOTE — ED ADULT TRIAGE NOTE - CHIEF COMPLAINT QUOTE
arrived by ambulance in c-collar c/o syncopal episode this morning while at pt  found her on floor, pt unable to recall events,  laceration noted to lip and bloody nose denies cp sob arrived by ambulance in c-collar c/o syncopal episode this morning while at pt  found her on floor, pt unable to recall events,  laceration noted to lip and bloody nose denies cp sob  arrived by ambulance A&OX3 in c-collar c/o syncopal episode this morning while at pt  found her on floor, pt unable to recall events,  laceration noted to lip and bloody nose denies cp sob

## 2018-06-19 NOTE — ED PROVIDER NOTE - ENMT, MLM
Airway patent, Nasal mucosa clear. Mouth with normal mucosa. Throat has no vesicles, no oropharyngeal exudates and uvula is midline. No loose or cracked teeth. Small chip to tooth # , old as per patient. 1cm laceration to inner lower lip. No mvmt of jaw when manipulating teeth. No nasal septal hematoma, hyphema, hemotympanum. Airway patent, Nasal mucosa clear. Mouth with normal mucosa. Throat has no vesicles, no oropharyngeal exudates and uvula is midline. No loose or cracked teeth. Small chip to tooth #9 , old as per patient. 1cm laceration to inner lower lip. No mvmt of jaw when manipulating teeth. bite intact. No nasal septal hematoma, hyphema, hemotympanum. Airway patent, Nasal mucosa clear. Mouth with normal mucosa. Throat has no vesicles, no oropharyngeal exudates and uvula is midline. No loose or cracked teeth. Small chip to tooth #9 , old as per patient. 1cm laceration to inner lower lip. No mvmt of jaw when manipulating teeth. bite intact. No nasal septal hematoma, hyphema, hemotympanum. Dried blood around nares, tender to palpation over tip of nose, philtrum.

## 2018-06-19 NOTE — ED PROVIDER NOTE - PROGRESS NOTE DETAILS
Femi: thinks she had a tetanus shot with her recent pregnancy, does not wish to have another at this time. Femi: The patient has decided to leave against medical advice.  It has been explained to the patient that leaving prior to completion of work up and treatment may result in recurrent or worsening of symptoms, severe permanent disability, pain and suffering, and/or even death.  The patient has demonstrated comprehension and verbalizes understanding of these risks.  The patient has been told that he/she must return to the ER immediately for persistent or recurring symptoms, worsening symptoms, or any concerning symptoms.  The patient has also been informed that they may return to the ER immediately at any time if they change their mind and wish to resume care. The patient has been given the opportunity to ask questions about their medical condition.

## 2018-06-19 NOTE — ED PROVIDER NOTE - MEDICAL DECISION MAKING DETAILS
28F with syncopal event, r/o arrhythmia, fx, ich, electrolyte disturbance. check labs with cth, c-spine, max face. ucg. laceration repair.

## 2018-06-19 NOTE — ED PROVIDER NOTE - OBJECTIVE STATEMENT
28F with no PMHx presents after syncopal event. Patient woke up and felt abdominal cramping, went to the bathroom to have diarrhea. While on the toilet, patient felt lightheaded and then the next things she recalls she was on the floor with blood around her.  tried to assist her up to her feet but she felt lightheaded. EMS called. Patient placed in c-collar for neck pain. currently c/o neck pain and headache, facial pain- mainly the tip of her nose and inner lip. Denies any chest or abdominal pain, nausea, vomiting, fever, chills, dysuria, urinary frequency. Was feeling well last night, no alcohol use, went to bed early. LMP 3 weeks ago.

## 2018-06-19 NOTE — ED ADULT NURSE NOTE - OBJECTIVE STATEMENT
Pt BIBA after falling in bathroom when feeling dizzy while using the toilet. Pt arrived with O2 via nasal canula, NS infusing, and IV in Right hand placed by EMS. Pt is A&O x4. Pt had diarrhea and a stomach ache in the morning but denies any current N/V/D. Pt is in C-collar and complains of 10/10 pain in head, face, and neck. Pt states she does not want anything for pain because she does not take pain meds. VS are stable, 20 G IV placed in Left AC. Labs obtained and sent as ordered. MD examined. Pt awaiting CT. Safety precautions maintained and call be in pts hand.

## 2018-06-19 NOTE — ED ADULT NURSE NOTE - CHIEF COMPLAINT QUOTE
arrived by ambulance A&OX3 in c-collar c/o syncopal episode this morning while at pt  found her on floor, pt unable to recall events,  laceration noted to lip and bloody nose denies cp sob

## 2021-03-17 ENCOUNTER — APPOINTMENT (OUTPATIENT)
Dept: OBGYN | Facility: CLINIC | Age: 31
End: 2021-03-17
Payer: COMMERCIAL

## 2021-03-17 ENCOUNTER — APPOINTMENT (OUTPATIENT)
Dept: OBGYN | Facility: CLINIC | Age: 31
End: 2021-03-17

## 2021-03-17 VITALS
SYSTOLIC BLOOD PRESSURE: 120 MMHG | BODY MASS INDEX: 25.61 KG/M2 | HEIGHT: 64 IN | WEIGHT: 150 LBS | DIASTOLIC BLOOD PRESSURE: 76 MMHG

## 2021-03-17 DIAGNOSIS — Z31.69 ENCOUNTER FOR OTHER GENERAL COUNSELING AND ADVICE ON PROCREATION: ICD-10-CM

## 2021-03-17 DIAGNOSIS — Z30.09 ENCOUNTER FOR OTHER GENERAL COUNSELING AND ADVICE ON CONTRACEPTION: ICD-10-CM

## 2021-03-17 PROCEDURE — 99214 OFFICE O/P EST MOD 30 MIN: CPT

## 2021-03-17 PROCEDURE — 99072 ADDL SUPL MATRL&STAF TM PHE: CPT

## 2021-03-17 RX ORDER — DOCONEXENT, NIACINAMIDE, .ALPHA.-TOCOPHEROL ACETATE, DL-, CHOLECALCIFEROL, .BETA.-CAROTENE, ASCORBIC ACID, THIAMINE MONONITRATE, RIBOFLAVIN, PYRIDOXINE HYDROCHLORIDE, CYANOCOBALAMIN, IRON, ZINC OXIDE, CUPRIC OXIDE, POTASSIUM IODIDE, MAGNESIUM OXIDE, FOLIC ACID, AND LEVOMEFOLATE CALCIUM 200; 15; 20; 1000; 1100; 30; 1.6; 1.8; 2.5; 12; 29; 25; 2; 150; 20; .4; .6 MG/1; MG/1; [IU]/1; [IU]/1; [IU]/1; MG/1; MG/1; MG/1; MG/1; UG/1; MG/1; MG/1; MG/1; UG/1; MG/1; MG/1; MG/1
29-0.6-0.4-2 CAPSULE, LIQUID FILLED ORAL
Qty: 90 | Refills: 3 | Status: ACTIVE | COMMUNITY
Start: 2021-03-17 | End: 1900-01-01

## 2021-03-17 NOTE — PLAN
[FreeTextEntry1] : d/w pt birth control options\par also discussed with pt pnv and timed intercourse

## 2021-03-17 NOTE — HISTORY OF PRESENT ILLNESS
[FreeTextEntry1] : pt presents to discuss options. states she wants birth control but  wants to have a baby

## 2021-06-07 ENCOUNTER — APPOINTMENT (OUTPATIENT)
Dept: OBGYN | Facility: CLINIC | Age: 31
End: 2021-06-07
Payer: COMMERCIAL

## 2021-06-07 VITALS
SYSTOLIC BLOOD PRESSURE: 104 MMHG | WEIGHT: 154 LBS | HEIGHT: 62 IN | BODY MASS INDEX: 28.34 KG/M2 | DIASTOLIC BLOOD PRESSURE: 64 MMHG

## 2021-06-07 PROCEDURE — 99395 PREV VISIT EST AGE 18-39: CPT

## 2021-06-07 PROCEDURE — 99072 ADDL SUPL MATRL&STAF TM PHE: CPT

## 2021-06-21 LAB
C TRACH RRNA SPEC QL NAA+PROBE: NOT DETECTED
HPV HIGH+LOW RISK DNA PNL CVX: NOT DETECTED
N GONORRHOEA RRNA SPEC QL NAA+PROBE: NOT DETECTED
SOURCE TP AMPLIFICATION: NORMAL

## 2021-07-02 NOTE — LACTATION INITIAL EVALUATION - BREAST ASSESSMENT (LEFT)
Lateral Wound almost healed (dorsal well healed, medial red streak dry, stable), mechanical debridement  Brother  last week, possible overdose  Cont same, f/u 2 wks hopefully will be healed    Patient: Phu Navarro Date: 2021   : 1981    40 year old female      OUTPATIENT WOUND CARE CONSULT NOTE    Supervising Wound Care / Hyperbaric Medicine Physician: Not Applicable  Consulting Provider:  Vitor Guadarrama DO  Date of Consultation/Last Comprehensive Exam:  2021  Referring  Provider:  Aaron Fleming MD    SUBJECTIVE:    Chief Complaint: Non-healing surgical wound     Wound/Ulcer Present:    Non-healing surgical wound    Additional Wound Category:  Venous leg ulcer:  Current Vascular Assessment:  Physical exam.  Current Venous Type:  Venous insufficiency contributing to non-healing surgical wound    Has the patient received adequate compression therapy for equal to or greater than 4 weeks?  N/A       Maximum Baseline Ambulatory Status:  Ambulates unassisted    History of Present Illness:  This is a 40 year old female who underwent foot surgery on 2021 to correct left cavovarus/peroneal tendinitis. There were several surgical incisions, most of which have healed without problem. Her left lateral leg/ankle incision developed dehiscence and the pt was referred to wound care for further evaluation and treatment of this wound.   The pt reports otherwise she is doing well. She does have tenderness to the wound and remarks that she tends to sleep with her leg externally rotated from the hip so that the open incision has pressure/friction to the area.   No fever or chills, not much drainage. She does have problems with intermittent LE swelling. She tends to bruise/bleed easily d/t anticoagulation. She is on warfarin d/t previous PE's. She has been on metformin for years to treat PCOs but she was recently diagnosed with diabetes. A1c at 6.5 on metformin. She isn't testing blood sugars yet but does  have appt with DM education at end of June.     Update 7/02/2021: Pt here for f/u of the above. She did see Dr. Fleming last week and he had no concerns about her progress. Pt tolerating Medihoney and VCC wraps without difficulty. No fever or chills.     Current Treatment Regimen:  Dressing:  Medihoney and VCC compression wrap  Frequency:  Twice a week   Changed by:  Wound care clinic nurse    Review of Systems:  Pertinent items are noted in HPI (history of present illness).    Past Medical History:   Diagnosis Date   • Acid reflux    • Acne    • Anemia     per patient   • Anxiety    • Arthritis    • Bronchitis    • Depression    • Hematuria, gross 5/8/2017   • Hepatic steatosis 10/30/2018   • Hypothyroidism    • Nervous disorder    • Obesity, unspecified    • Polycystic ovarian syndrome    • PONV (postoperative nausea and vomiting)     nausea and vomiting from back injections with local sedation   • Surgical wound, non healing, initial encounter 6/17/2021   • Thyroid disease    • Type II or unspecified type diabetes mellitus without mention of complication, not stated as uncontrolled 04/01/2006    pre diabetic per patient   • Unspecified sleep apnea     no cpap use per patient   • Urinary incontinence     takes vesicare, wears pads per patient   • Vitamin D deficiency      Past Surgical History:   Procedure Laterality Date   • Achilles tendon surgery Left 04/16/2021   • Appendectomy  01/01/1997   • Carpal tunnel release  07/19/2010    right   • Carpal tunnel release  07/14/2004    left   • Cholecystectomy  12/07/2018    Robotic (Dr. Gonzalez)   • Cystourethroscopy  06/19/2017   • Esophagogastroduodenoscopy transoral flex w/bx single or mult  06/21/2018    Dr Levy    • Foot surgery Right     has hardware   • Inj paravert facet anes,lumbar  2/7/2006    Jet Lumbar Medial Branch Block   • Inj paravert facet anes,lumbar  2/21/2006    Bilateral Lumbar Medial Branch Block   • Mouth surgery proc unlisted  09/05/2002     plastic repair of palate   • Past surgical history Bilateral     9/29/2020 left knee genicular cooled radio frequency ablation and on 10/06/2020 right knee genicular cooled radio frequency ablation with Dr. Arauz   • Past surgical history Right 03/13/2013    right foot procedure with Dr. Fleming   • Removal of tonsils,10+ y/o  09/05/2002    Tonsillectomy (as adult)   • Vascular surgery  6-5-2012    lumbar medial branch block   • Xray mammogram screening bilat  01/07/2011     Social History     Socioeconomic History   • Marital status: /Civil Union     Spouse name: [place]   • Number of children: 1   • Years of education: Not on file   • Highest education level: Not on file   Occupational History   • Occupation: DISABILITY     Comment: UNEMPLOYED   Tobacco Use   • Smoking status: Never Smoker   • Smokeless tobacco: Never Used   Substance and Sexual Activity   • Alcohol use: No   • Drug use: No   • Sexual activity: Yes     Partners: Male   Other Topics Concern   • Not on file   Social History Narrative   • Not on file     Social Determinants of Health     Financial Resource Strain:    • Social Determinants: Financial Resource Strain:    Food Insecurity:    • Social Determinants: Food Insecurity:    Transportation Needs:    • Lack of Transportation (Medical):    • Lack of Transportation (Non-Medical):    Physical Activity:    • Days of Exercise per Week:    • Minutes of Exercise per Session:    Stress:    • Social Determinants: Stress:    Social Connections:    • Social Determinants: Social Connections:    Intimate Partner Violence: Not At Risk   • Social Determinants: Intimate Partner Violence Past Fear: No   • Social Determinants: Intimate Partner Violence Current Fear: No     Family History   Problem Relation Age of Onset   • Hypertension Mother    • Heart Mother    • Hypertension Father    • Cancer Paternal Grandmother         breast   • Thyroid Paternal Grandmother    • Hypertension Paternal Grandmother     • Glaucoma Paternal Grandmother    • Cancer Maternal Aunt         breast   • Diabetes Paternal Uncle    • Cancer, Colon Paternal Uncle    • Genitourinary Neg Hx         neg gu cancers       Current Outpatient Medications   Medication Sig   • pantoprazole (PROTONIX) 40 MG tablet Take 1 tablet by mouth daily.   • blood glucose meter Test blood sugar 1 times daily. Diagnosis: E11.9. Meter: Insurance preferred   • blood glucose test strip Test blood sugar 1 times daily. Diagnosis: E11.9. Meter: Insurance Preferred   • blood glucose lancets Test blood sugar 1 times daily. Diagnosis: E11.9. Meter: Insurance Preferred   • metFORMIN (GLUCOPHAGE-XR) 500 MG 24 hr tablet Take 1 tablet by mouth 2 times daily.   • Euthyrox 150 MCG tablet Take 1 tablet by mouth once daily   • Probiotic Product (Florajen3) capsule Take 1 capsule by mouth 2 times daily.   • mirabegron ER (Myrbetriq) 25 MG 24 hour tablet Take 1 tablet by mouth daily. Patient to follow up in 2 weeks.   • DULoxetine (CYMBALTA) 60 MG capsule Take 2 capsules by mouth every evening.   • warfarin (COUMADIN) 5 MG tablet TAKE 1 & 1/2 (ONE & ONE-HALF) TABLETS BY MOUTH ONCE DAILY OR AS DIRECTED BY ANTICOAGULATION CLINIC   • gabapentin (NEURONTIN) 300 MG capsule Take 1 capsule by mouth 3 times daily.   • oxyCODONE-acetaminophen (Percocet) 5-325 MG per tablet Take 1-2 tablets by mouth every 6 hours as needed for Pain.   • ferrous gluconate 324 (37.5 Fe) MG tablet Take 1 tablet by mouth 2 times daily.   • Nystop [id number] UNIT/GM powder APPLY  POWDER TOPICALLY THREE TIMES DAILY   • tizanidine (ZANAFLEX) 4 MG capsule Take 1 capsule by mouth at bedtime as needed for Muscle spasms.     No current facility-administered medications for this encounter.        ALLERGIES:  Patient has no known allergies.    OBJECTIVE:  Vital Signs:    Visit Vitals  /80 (BP Location: LFA - Left forearm, Patient Position: Supine)   Pulse 84   Temp 97.2 °F (36.2 °C) (Temporal)   Resp 20   LMP  02/02/2021         Physical Exam:  General appearance: Appears stated age, obese, oriented to person, place, time and situation, in no distress and cooperative  Pulmonary: normal respiratory effort  Extremities: RLE, 2 + edema RLE    Previously: Cardiac: Heart:  regular rate and rhythm and Pedal pulses multiphasic b/l      Ulcer and Wound:   L lateral ankle with linear incision. There is now only one open area with slough at the base. I'm able to remove most of this with mechanical debridement (saline and gauze).    There is a very superficial linear abrasion to the medial foot which I believe is a friction injury. It is stable     Her dorsal foot wound remains closed/healed.     7/02/2021        Wound Bed Quality:  Granulation tissue, Fibrin and Alfie-epithelialization      Mary Beth-wound Quality:    None    Additional Descriptors:  drainage - light to moderate    Wound Measurements Per Flowsheet:       Wound Heel Left Incision (Active)   Number of days: 77       Wound Ankle Left Outer Incision (Active)   Number of days: 77       Wound Foot Left Interior/Inner Incision (Active)   Number of days: 77       Wound Ankle Left Lateral Surgical Wound (Active)   Wound Care Team Consult Date 06/17/21 06/17/21 1300   Wound Length (cm) 0.3 cm 06/29/21 1100   Wound Width (cm) 0.2 cm 06/29/21 1100   Wound Depth (cm) 0.1 cm 06/29/21 1100   Wound Surface Area (cm^2) 0.06 cm^2 06/29/21 1100   Wound Volume (cm^3) 0.01 cm^3 06/29/21 1100   Wound Volume Change (Initial) -0.07 cm3 06/29/21 1100   Wound Volume % Change (Initial) -92.5 % 06/29/21 1100   Number of days: 15         PROCEDURE:  None performed    Procedure was Performed by:  Physician     Laboratory assessments reviewed:  No results found for: PAB   Albumin (g/dL)   Date Value   06/25/2021 3.7   05/29/2021 3.2 (L)   03/27/2021 3.3 (L)      No results available in last 24 hours    Lab Results   Component Value Date    WBC 9.8 06/25/2021    GLUCOSE 114 (H) 06/25/2021    HGBA1C 6.5  (H) 05/29/2021    CRP 0.7 05/29/2021    RESR 34 (H) 06/22/2021    CREATININE 0.90 06/25/2021    GFRA >90 12/09/2019    GFRNA >90 12/09/2019        Culture results:  Specimen Description (no units)   Date Value   12/30/2019 URINE, CLEAN CATCH/MIDSTREAM   07/20/2019 STOOL STOOL   07/20/2019 STOOL   07/16/2019 URINE, CLEAN CATCH/MIDSTREAM    CULTURE (no units)   Date Value   12/30/2019     60,000 ,000 CFU/mL MULTIPLE ORGANISMS ISOLATED WITH NO PREDOMINANT TYPE, CONSISTENT WITH CONTAMINATION. CONSIDER RECOLLECTION.   12/30/2019 (P)    60,000 ,000 CFU/mL STREPTOCOCCUS AGALACTIAE (STREP GROUP B)   12/30/2019     The presence of Streptococcus agalactiae (Strep Group B) is likely of limited clinical significance in urinary tract infections, however, its presence is reported in women of childbearing age in the event that they are or may become pregnant.   07/20/2019 (P)    SALMONELLA SEROTYPE ENTERICA SUBSPECIES I (ENTERICA) JOIE   07/20/2019     SEROTYPING PERFORMED BY  [facility]. [facility] LABORATORY OF HYGIENE - [address] [place] [id number]   07/20/2019     THIS SAMPLE HAS BEEN SCREENED FOR SALMONELLA, SHIGELLA, AEROMONAS AND PLESIOMONAS. IF PRESENT, IDENTIFICATION HAS BEEN PERFORMED AND SUSCEPTIBILITY TESTING PERFORMED AND REPORTED IF APPLICABLE.   07/20/2019 (P)    SALMONELLA SPECIES CALLED TO, READ BACK AND CONFIRMED BY OCTAVIA TIRADO 7/22/2019 [id number]   07/20/2019 NEGATIVE FOR CAMPYLOBACTER BY EIA.   07/20/2019 NEGATIVE FOR SHIGA TOXIN BY EIA.        Diagnostic Assessments Reviewed:  No new update    Nutritional Assessment:  Prealbumin and/or Albumin reviewed    Wound treatment goals are palliative:  No    DIAGNOSES:  Non healing surgical wound  Venous insufficiency  DM2, controlled  Chronic anticoagulation on warfarin d/t hx PE  Super morbid obesity with bmi 65      Medical Decision Making:   This is a very pleasant, morbidly obese 40-year-old who underwent foot and ankle surgery in mid April.  She  did fairly well immediately after the surgery but then developed some separation of the lateral ankle incision.  She has been referred to wound care for further evaluation and treatment.  On examination the wound has a moderate amount of fibrinous tissue.  Some of the fibrin is very superficial and easily debrided.  She has a small full thickness area that is a little deeper with a mix of a granulation and fibrinous tissue.  She also has venous insufficiency which is likely contributing to the incisional breakdown.  Local wound treatment will be a Medihoney dressing which should deter growth of bacteria, provide a slightly moist environment to augment healing and promote autolytic debridement of the wound.  We will treat her venous insufficiency with a Visco cotton Coban wrap.  We will plan dressing changes twice a week since she does not have significant drainage.  The wrap should also help protect her heel/a medial foot where she has a superficial, pressure like injury.    UPDATE 7/02/2021:   Much improved. Will continue same. F/U in 2 wks - possibly healed at that time.     Local care:  Medihoney    Compression:  Visco cotton Coban wrap, dressing changes to times per week in clinic    Infectious Disease:  The a wound does not appear to be infected.  Previous culture was negative for bacterial growth.    Vascular:  Easily palpable and predominantly triphasic pulses to the posterior tibial and dorsalis pedal arteries bilaterally.    Endocrine:  Patient is a diabetic, newly diagnosed.  She has had elevated blood sugars and polydypsia stick ovarian syndrome treated with metformin for several years.  Current A1 c at 6.5 is well controlled.  Discussed the importance of diabetes control to prevent vascular injury.  Encouraged a healthy diet with lean proteins, fruit and vegetables    Discussed monitoring for signs of arterial compromise.  She certainly can remove the wrap if it becomes too tight, toes become cool or  blue and painful.  She does need to keep the wrap completely dry.  She is given a a medium Tetragrip to use on her right lower extremity and 1 for Back up compression if the wrap needs to be removed from her left lower extremity.  She sees Dr. Fleming early next week.  A can certainly remove the wrap and she can use a Tetragrip until she is back in the clinic for dressing change.      Plan of Care:  Advanced Wound Care Recommendations:  See above  Percent Wound Closure from consult:  Initial consult 06/17/2021  Care plan to augment wound closure:  If not making adequate progress additional evaluation to rule out infection would be considered.  Since she has now a diabetic we may be able to get insurance authorization for a dermal replacement if not healing.    Thank you for the opportunity to participate in this patient's care.     Vitor Guadarrama D.O.  [facility]  [facility]  Pager # [phone]   medium

## 2022-06-17 ENCOUNTER — APPOINTMENT (OUTPATIENT)
Dept: OBGYN | Facility: CLINIC | Age: 32
End: 2022-06-17
Payer: COMMERCIAL

## 2022-06-17 VITALS
HEIGHT: 62 IN | WEIGHT: 155 LBS | BODY MASS INDEX: 28.52 KG/M2 | SYSTOLIC BLOOD PRESSURE: 104 MMHG | DIASTOLIC BLOOD PRESSURE: 70 MMHG

## 2022-06-17 DIAGNOSIS — N89.8 OTHER SPECIFIED NONINFLAMMATORY DISORDERS OF VAGINA: ICD-10-CM

## 2022-06-17 PROCEDURE — 99395 PREV VISIT EST AGE 18-39: CPT

## 2022-06-17 NOTE — HISTORY OF PRESENT ILLNESS
[FreeTextEntry1] : pt presents for annual doing well. states she has been having an increase in clear d/c lately

## 2022-06-20 DIAGNOSIS — N76.0 ACUTE VAGINITIS: ICD-10-CM

## 2022-06-20 LAB
C TRACH RRNA SPEC QL NAA+PROBE: NOT DETECTED
CANDIDA VAG CYTO: NOT DETECTED
G VAGINALIS+PREV SP MTYP VAG QL MICRO: DETECTED
HPV HIGH+LOW RISK DNA PNL CVX: NOT DETECTED
N GONORRHOEA RRNA SPEC QL NAA+PROBE: NOT DETECTED
SOURCE TP AMPLIFICATION: NORMAL
T VAGINALIS VAG QL WET PREP: NOT DETECTED

## 2022-06-20 RX ORDER — METRONIDAZOLE 500 MG/1
500 TABLET ORAL TWICE DAILY
Qty: 14 | Refills: 0 | Status: ACTIVE | COMMUNITY
Start: 2022-06-20 | End: 1900-01-01

## 2022-06-23 LAB — CYTOLOGY CVX/VAG DOC THIN PREP: NORMAL

## 2022-11-14 NOTE — ED PROVIDER NOTE - ATTENDING CONTRIBUTION TO CARE
I, Marlin Kahn MD,  performed the initial face to face bedside interview with this patient regarding history of present illness, review of symptoms and relevant past medical, social and family history.  I completed an independent physical examination.  I was the initial provider who evaluated this patient. I have signed out the follow up of any pending tests (i.e. labs, radiological studies) to the resident.  I have communicated the patient’s plan of care and disposition with the resident.  28 F p/w syncope, facial trauma after fall from toilet    vss  dried blood around nares and philtrum, upper gums  1 cm lac to muc surf lower lip partial thickness,   no hemotympanum  perrl eomi  ttp along philtrum and inf nose  no subluxation of teeth, no fx  c spine ttp c2, in immobilizer  lcta  rrr  abdomen soft nt nd no masses  ext from 2+ dp's , radial pulses,   NVI,     will obtain ct head cspine facial bones,   obtain ekg labs ur hcg  administer tetanus tylenol reassess Epidermal Autograft Text: The defect edges were debeveled with a #15 scalpel blade.  Given the location of the defect, shape of the defect and the proximity to free margins an epidermal autograft was deemed most appropriate.  Using a sterile surgical marker, the primary defect shape was transferred to the donor site. The epidermal graft was then harvested.  The skin graft was then placed in the primary defect and oriented appropriately.

## 2023-07-19 ENCOUNTER — APPOINTMENT (OUTPATIENT)
Dept: OBGYN | Facility: CLINIC | Age: 33
End: 2023-07-19
Payer: COMMERCIAL

## 2023-07-19 VITALS
WEIGHT: 158 LBS | SYSTOLIC BLOOD PRESSURE: 126 MMHG | HEIGHT: 62 IN | BODY MASS INDEX: 29.08 KG/M2 | DIASTOLIC BLOOD PRESSURE: 82 MMHG

## 2023-07-19 DIAGNOSIS — R10.2 PELVIC AND PERINEAL PAIN: ICD-10-CM

## 2023-07-19 DIAGNOSIS — Z01.419 ENCOUNTER FOR GYNECOLOGICAL EXAMINATION (GENERAL) (ROUTINE) W/OUT ABNORMAL FINDINGS: ICD-10-CM

## 2023-07-19 PROCEDURE — 99395 PREV VISIT EST AGE 18-39: CPT

## 2023-07-21 LAB
BACTERIA UR CULT: NORMAL
C TRACH RRNA SPEC QL NAA+PROBE: NOT DETECTED
HPV HIGH+LOW RISK DNA PNL CVX: NOT DETECTED
N GONORRHOEA RRNA SPEC QL NAA+PROBE: NOT DETECTED
SOURCE TP AMPLIFICATION: NORMAL

## 2023-07-24 LAB — CYTOLOGY CVX/VAG DOC THIN PREP: NORMAL

## 2024-07-24 ENCOUNTER — APPOINTMENT (OUTPATIENT)
Dept: OBGYN | Facility: CLINIC | Age: 34
End: 2024-07-24
Payer: COMMERCIAL

## 2024-07-24 VITALS
DIASTOLIC BLOOD PRESSURE: 74 MMHG | HEIGHT: 62 IN | BODY MASS INDEX: 29.08 KG/M2 | WEIGHT: 158 LBS | SYSTOLIC BLOOD PRESSURE: 122 MMHG

## 2024-07-24 DIAGNOSIS — Z01.419 ENCOUNTER FOR GYNECOLOGICAL EXAMINATION (GENERAL) (ROUTINE) W/OUT ABNORMAL FINDINGS: ICD-10-CM

## 2024-07-24 LAB
HCG UR QL: NEGATIVE
QUALITY CONTROL: YES

## 2024-07-24 PROCEDURE — 99459 PELVIC EXAMINATION: CPT

## 2024-07-24 PROCEDURE — 99395 PREV VISIT EST AGE 18-39: CPT

## 2024-07-24 NOTE — HISTORY OF PRESENT ILLNESS
[FreeTextEntry1] : pt presents for annual doing well w/o c/o. states her insurance will cover a mammogram and would like an rx

## 2024-07-24 NOTE — PHYSICAL EXAM
[Chaperone Present] : A chaperone was present in the examining room during all aspects of the physical examination [81237] : A chaperone was present during the pelvic exam. [Appropriately responsive] : appropriately responsive [Alert] : alert [No Acute Distress] : no acute distress [No Lymphadenopathy] : no lymphadenopathy [Regular Rate Rhythm] : regular rate rhythm [No Murmurs] : no murmurs [Clear to Auscultation B/L] : clear to auscultation bilaterally [Soft] : soft [Non-tender] : non-tender [Non-distended] : non-distended [No HSM] : No HSM [No Lesions] : no lesions [No Mass] : no mass [Oriented x3] : oriented x3 [Examination Of The Breasts] : a normal appearance [No Masses] : no breast masses were palpable [Labia Majora] : normal [Labia Minora] : normal [Normal] : normal [Uterine Adnexae] : normal

## 2024-07-29 LAB — CYTOLOGY CVX/VAG DOC THIN PREP: NORMAL

## 2024-09-06 ENCOUNTER — APPOINTMENT (OUTPATIENT)
Dept: OBGYN | Facility: CLINIC | Age: 34
End: 2024-09-06

## 2024-10-18 ENCOUNTER — NON-APPOINTMENT (OUTPATIENT)
Age: 34
End: 2024-10-18

## 2025-02-07 NOTE — ED PROVIDER NOTE - CPE EDP RESP NORM
Blood pressure now controlled with low-salt diet and Coreg
Detail Level: Generalized
General Sunscreen Counseling: I recommended a broad spectrum sunscreen with a SPF of 30 or higher.  I explained that SPF 30 sunscreens block approximately 97 percent of the sun's harmful rays.  Sunscreens should be applied at least 15 minutes prior to expected sun exposure and then every 2 hours after that as long as sun exposure continues. If swimming or exercising sunscreen should be reapplied every 45 minutes to an hour after getting wet or sweating.  One ounce, or the equivalent of a shot glass full of sunscreen, is adequate to protect the skin not covered by a bathing suit. I also recommended a lip balm with a sunscreen as well. Sun protective clothing can be used in lieu of sunscreen but must be worn the entire time you are exposed to the sun's rays.
Products Recommended: Neutrogena Ultra Sheer, Neutrogena Dry Touch, Elta MD
normal...

## 2025-07-30 ENCOUNTER — APPOINTMENT (OUTPATIENT)
Dept: OBGYN | Facility: CLINIC | Age: 35
End: 2025-07-30
Payer: COMMERCIAL

## 2025-07-30 ENCOUNTER — TRANSCRIPTION ENCOUNTER (OUTPATIENT)
Age: 35
End: 2025-07-30

## 2025-07-30 VITALS
BODY MASS INDEX: 27.23 KG/M2 | SYSTOLIC BLOOD PRESSURE: 110 MMHG | HEIGHT: 62 IN | DIASTOLIC BLOOD PRESSURE: 70 MMHG | WEIGHT: 148 LBS

## 2025-07-30 DIAGNOSIS — Z01.419 ENCOUNTER FOR GYNECOLOGICAL EXAMINATION (GENERAL) (ROUTINE) W/OUT ABNORMAL FINDINGS: ICD-10-CM

## 2025-07-30 PROCEDURE — 99395 PREV VISIT EST AGE 18-39: CPT

## 2025-07-30 PROCEDURE — 99459 PELVIC EXAMINATION: CPT

## 2025-08-04 LAB — CYTOLOGY CVX/VAG DOC THIN PREP: NORMAL
